# Patient Record
Sex: FEMALE | Race: WHITE | Employment: STUDENT | URBAN - NONMETROPOLITAN AREA
[De-identification: names, ages, dates, MRNs, and addresses within clinical notes are randomized per-mention and may not be internally consistent; named-entity substitution may affect disease eponyms.]

---

## 2018-07-25 ENCOUNTER — OFFICE VISIT (OUTPATIENT)
Dept: FAMILY MEDICINE CLINIC | Age: 18
End: 2018-07-25
Payer: COMMERCIAL

## 2018-07-25 ENCOUNTER — HOSPITAL ENCOUNTER (OUTPATIENT)
Facility: HOSPITAL | Age: 18
Discharge: HOME | End: 2018-07-25
Payer: COMMERCIAL

## 2018-07-25 VITALS
RESPIRATION RATE: 18 BRPM | OXYGEN SATURATION: 99 % | WEIGHT: 187 LBS | DIASTOLIC BLOOD PRESSURE: 64 MMHG | BODY MASS INDEX: 34.41 KG/M2 | SYSTOLIC BLOOD PRESSURE: 120 MMHG | HEART RATE: 82 BPM | HEIGHT: 62 IN

## 2018-07-25 DIAGNOSIS — L73.2 HYDRADENITIS: Primary | ICD-10-CM

## 2018-07-25 DIAGNOSIS — R53.83 FATIGUE, UNSPECIFIED TYPE: ICD-10-CM

## 2018-07-25 DIAGNOSIS — E66.09 CLASS 2 OBESITY DUE TO EXCESS CALORIES WITHOUT SERIOUS COMORBIDITY IN ADULT, UNSPECIFIED BMI: ICD-10-CM

## 2018-07-25 DIAGNOSIS — L73.2 HYDRADENITIS: ICD-10-CM

## 2018-07-25 LAB
A/G RATIO: 2 (ref 0.8–2)
ALBUMIN SERPL-MCNC: 4.8 G/DL (ref 3.4–4.8)
ALP BLD-CCNC: 82 U/L (ref 25–100)
ALT SERPL-CCNC: 15 U/L (ref 4–36)
ANION GAP SERPL CALCULATED.3IONS-SCNC: 14 MMOL/L (ref 3–16)
AST SERPL-CCNC: 14 U/L (ref 8–33)
BASOPHILS ABSOLUTE: 0.1 K/UL (ref 0–0.1)
BASOPHILS RELATIVE PERCENT: 0.5 %
BILIRUB SERPL-MCNC: 1.7 MG/DL (ref 0.3–1.2)
BUN BLDV-MCNC: 11 MG/DL (ref 6–20)
CALCIUM SERPL-MCNC: 9.7 MG/DL (ref 8.5–10.5)
CHLORIDE BLD-SCNC: 103 MMOL/L (ref 98–107)
CO2: 23 MMOL/L (ref 20–30)
CREAT SERPL-MCNC: 0.8 MG/DL (ref 0.4–1.2)
EOSINOPHILS ABSOLUTE: 0.3 K/UL (ref 0–0.4)
EOSINOPHILS RELATIVE PERCENT: 2.8 %
GFR AFRICAN AMERICAN: >59
GFR NON-AFRICAN AMERICAN: >60
GLOBULIN: 2.4 G/DL
GLUCOSE BLD-MCNC: 80 MG/DL (ref 74–106)
HCT VFR BLD CALC: 46.8 % (ref 37–47)
HEMOGLOBIN: 15.5 G/DL (ref 11.5–16.5)
IMMATURE GRANULOCYTES #: 0 K/UL
IMMATURE GRANULOCYTES %: 0.2 % (ref 0–5)
LYMPHOCYTES ABSOLUTE: 2.4 K/UL (ref 1.5–4)
LYMPHOCYTES RELATIVE PERCENT: 24.8 %
MCH RBC QN AUTO: 29.7 PG (ref 27–32)
MCHC RBC AUTO-ENTMCNC: 33.1 G/DL (ref 31–35)
MCV RBC AUTO: 89.7 FL (ref 80–100)
MONOCYTES ABSOLUTE: 1.1 K/UL (ref 0.2–0.8)
MONOCYTES RELATIVE PERCENT: 11.1 %
NEUTROPHILS ABSOLUTE: 5.8 K/UL (ref 2–7.5)
NEUTROPHILS RELATIVE PERCENT: 60.6 %
PDW BLD-RTO: 12.4 % (ref 11–16)
PLATELET # BLD: 383 K/UL (ref 150–400)
PMV BLD AUTO: 10.5 FL (ref 6–10)
POTASSIUM SERPL-SCNC: 4.4 MMOL/L (ref 3.4–5.1)
RBC # BLD: 5.22 M/UL (ref 3.8–5.8)
SODIUM BLD-SCNC: 140 MMOL/L (ref 136–145)
TOTAL PROTEIN: 7.2 G/DL (ref 6.4–8.3)
TSH SERPL DL<=0.05 MIU/L-ACNC: 2.17 UIU/ML (ref 0.35–5.5)
WBC # BLD: 9.6 K/UL (ref 4–11)

## 2018-07-25 PROCEDURE — 85025 COMPLETE CBC W/AUTO DIFF WBC: CPT

## 2018-07-25 PROCEDURE — 80053 COMPREHEN METABOLIC PANEL: CPT

## 2018-07-25 PROCEDURE — 84443 ASSAY THYROID STIM HORMONE: CPT

## 2018-07-25 PROCEDURE — 36415 COLL VENOUS BLD VENIPUNCTURE: CPT

## 2018-07-25 PROCEDURE — 99203 OFFICE O/P NEW LOW 30 MIN: CPT | Performed by: NURSE PRACTITIONER

## 2018-07-25 RX ORDER — PHENTERMINE HYDROCHLORIDE 37.5 MG/1
37.5 TABLET ORAL
Qty: 30 TABLET | Refills: 0 | Status: SHIPPED | OUTPATIENT
Start: 2018-07-25 | End: 2018-08-23 | Stop reason: SDUPTHER

## 2018-07-25 ASSESSMENT — PATIENT HEALTH QUESTIONNAIRE - PHQ9
SUM OF ALL RESPONSES TO PHQ QUESTIONS 1-9: 0
2. FEELING DOWN, DEPRESSED OR HOPELESS: 0
SUM OF ALL RESPONSES TO PHQ9 QUESTIONS 1 & 2: 0
1. LITTLE INTEREST OR PLEASURE IN DOING THINGS: 0

## 2018-07-25 NOTE — PROGRESS NOTES
Negative. Eyes: Negative. Respiratory: Negative. Cardiovascular: Negative. Gastrointestinal: Negative. Genitourinary: Negative. Musculoskeletal: Negative. Skin: Negative. Neurological: Negative. Psychiatric/Behavioral: Negative. All other systems reviewed and are negative. PHYSICAL EXAM:  /64 (Site: Left Arm, Position: Sitting, Cuff Size: Medium Adult)   Pulse 82   Resp 18   Ht 5' 2\" (1.575 m)   Wt 187 lb (84.8 kg)   LMP 06/01/2018 (Approximate)   SpO2 99% Comment: room air  Breastfeeding?  No   BMI 34.20 kg/m²     General Appearance: well developed and well- nourished, in no acute distress  Head: normocephalic and atraumatic  Eyes: pupils equal, round, and reactive to light,  conjunctivae and eye lids without erythema  ENT: external ear and ear canal normal bilaterally, nose without deformity, nasal mucosa and turbinates normal without polyps, oropharynx normal, dentition is normal for age, no lip or gum lesions noted  Neck: supple and non-tender without mass, no thyromegaly or thyroid nodules, no cervical lymphadenopathy  Pulmonary/Chest: clear to auscultation bilaterally- no wheezes, rales or rhonchi, normal air movement, no respiratory distress or retractions  Cardiovascular: normal rate, regular rhythm, normal S1 and S2, no murmurs, rubs, clicks, or gallops, distal pulses intact  Abdomen: soft, non-tender, non-distended, normal bowel sounds,  no rebound or guarding, no masses or hernias noted, no hepatospleenomegaly  Extremities: no cyanosis, clubbing or edema of the lower extremities  Musculoskeletal: No joint swelling or gross deformity   Neuro:  Alert, 5/5 strength globally and symmetrically, 2+ patellar reflexes b/l,  normal speech, no focal findings or movement disorder noted, gait wnl  Psych:  Normal affect without evidence of depression or anxiety, insight and judgement are appropriate, memory appears intact  Skin: warm and dry, no rash or erythema  Lymph:

## 2018-07-31 ASSESSMENT — ENCOUNTER SYMPTOMS
GASTROINTESTINAL NEGATIVE: 1
EYES NEGATIVE: 1
RESPIRATORY NEGATIVE: 1

## 2018-08-23 DIAGNOSIS — E66.09 CLASS 2 OBESITY DUE TO EXCESS CALORIES WITHOUT SERIOUS COMORBIDITY IN ADULT, UNSPECIFIED BMI: ICD-10-CM

## 2018-08-23 RX ORDER — PHENTERMINE HYDROCHLORIDE 37.5 MG/1
37.5 TABLET ORAL
Qty: 30 TABLET | Refills: 0 | Status: SHIPPED | OUTPATIENT
Start: 2018-08-23 | End: 2018-08-27 | Stop reason: SDUPTHER

## 2018-08-23 NOTE — TELEPHONE ENCOUNTER
Refill request received from pharmacy. Medication pending for approval.     Saul Cordoba scanned for review    Patient information below:      Hemoglobin A1C (%)   Date Value   04/21/2014 5.0     AST (U/L)   Date Value   07/25/2018 14     ALT (U/L)   Date Value   07/25/2018 15     BUN (mg/dL)   Date Value   07/25/2018 11      (goal A1C is < 7)   (goal LDL is <100) need 30-50% reduction from baseline     BP Readings from Last 3 Encounters:   07/25/18 120/64   04/28/14 92/62   04/21/14 118/64    (goal /80)      All Future Testing planned in [facility]:      Last Office Visit With PCP:  7/25/2018      Next Visit Date:  No future appointments.          Patient Active Problem List:     Vaginitis and vulvovaginitis     Environmental allergies     Vaginal discharge

## 2018-08-27 DIAGNOSIS — E66.09 CLASS 2 OBESITY DUE TO EXCESS CALORIES WITHOUT SERIOUS COMORBIDITY IN ADULT, UNSPECIFIED BMI: ICD-10-CM

## 2018-08-28 RX ORDER — PHENTERMINE HYDROCHLORIDE 37.5 MG/1
37.5 TABLET ORAL
Qty: 30 TABLET | Refills: 0 | Status: SHIPPED | OUTPATIENT
Start: 2018-08-28 | End: 2018-09-27

## 2025-01-23 ENCOUNTER — OFFICE VISIT (OUTPATIENT)
Age: 25
End: 2025-01-23
Payer: COMMERCIAL

## 2025-01-23 VITALS
HEIGHT: 60 IN | SYSTOLIC BLOOD PRESSURE: 120 MMHG | WEIGHT: 187 LBS | OXYGEN SATURATION: 99 % | HEART RATE: 102 BPM | DIASTOLIC BLOOD PRESSURE: 74 MMHG | BODY MASS INDEX: 36.71 KG/M2

## 2025-01-23 DIAGNOSIS — E28.2 PCOS (POLYCYSTIC OVARIAN SYNDROME): ICD-10-CM

## 2025-01-23 PROCEDURE — 84445 ASSAY OF TSI GLOBULIN: CPT | Performed by: PHYSICIAN ASSISTANT

## 2025-01-23 RX ORDER — BUSPIRONE HYDROCHLORIDE 10 MG/1
10 TABLET ORAL 2 TIMES DAILY
Qty: 60 TABLET | Refills: 2 | Status: SHIPPED | OUTPATIENT
Start: 2025-01-23

## 2025-01-23 RX ORDER — SERTRALINE HYDROCHLORIDE 100 MG/1
1 TABLET, FILM COATED ORAL DAILY
COMMUNITY

## 2025-01-23 RX ORDER — SPIRONOLACTONE 100 MG/1
1 TABLET, FILM COATED ORAL DAILY
COMMUNITY

## 2025-01-23 RX ORDER — METOPROLOL SUCCINATE 25 MG/1
25 TABLET, EXTENDED RELEASE ORAL
COMMUNITY

## 2025-01-23 NOTE — PROGRESS NOTES
Office Note      Date: 2025  Patient Name: Kary Figueroa  MRN: [id number]  : 2000    Chief Complaint   Patient presents with    Abnormal Lab     Low TSH       History of Present Illness:   Kary Figueroa is a 24 y.o. female who presents for Abnormal Lab (Low TSH)  Symptoms are: heat intolerance, increased bowel movements, palpitations, increased in agitation/anxiety. Symptoms began in: years ago. They are worsening. Lab work showed overactive function. Delivered baby 2024. No history of thyroid problems in past, but has had a few miscarriages over the last four years, has one other child, as well. Has metoprolol at home but has only taken one dose in the last week. No unintentional weight loss.    Also, just recently diagnosed with pcos and has facial hair. Started on metformin and spironolactone already recently.  Asking if she can take adipex for weight loss.    Subjective      Patient was born where: [place].  Facial radiation exposure: NO.  High iodine intake: No  Family hx of thyroid disease: No.    Review of Systems:   Review of Systems   Constitutional:  Positive for activity change, appetite change, diaphoresis and fatigue.   HENT:  Positive for rhinorrhea, tinnitus and trouble swallowing.    Eyes:  Positive for photophobia.   Respiratory:  Positive for choking, chest tightness and shortness of breath.    Cardiovascular:  Positive for chest pain and palpitations.   Gastrointestinal:  Positive for anal bleeding, blood in stool, constipation and diarrhea.   Endocrine: Positive for heat intolerance, polydipsia, polyphagia and polyuria (normal a1c last month).   Genitourinary:  Positive for urgency.   Musculoskeletal:  Positive for arthralgias, back pain and myalgias.   Allergic/Immunologic: Positive for environmental allergies.   Neurological:  Positive for dizziness, weakness and light-headedness.   Hematological:  Bruises/bleeds easily.   Psychiatric/Behavioral:  Positive for decreased  "concentration, dysphoric mood and sleep disturbance. The patient is nervous/anxious.        The following portions of the patient's history were reviewed and updated as appropriate: allergies, current medications, past family history, past medical history, past social history, past surgical history, and problem list.    Objective     Visit Vitals  /74 (BP Location: Left arm, Patient Position: Sitting, Cuff Size: Adult)   Pulse 102   Ht 152.4 cm (60\")   Wt 84.8 kg (187 lb)   SpO2 99%   BMI 36.52 kg/m²       Physical Exam:  Physical Exam  Vitals reviewed.   Constitutional:       General: She is not in acute distress.     Appearance: Normal appearance. She is obese.   Neck:      Thyroid: No thyroid mass, thyromegaly or thyroid tenderness.   Cardiovascular:      Rate and Rhythm: Regular rhythm. Tachycardia present.      Heart sounds: Normal heart sounds.   Pulmonary:      Effort: Pulmonary effort is normal.      Breath sounds: Normal breath sounds.   Musculoskeletal:      Cervical back: Neck supple.   Lymphadenopathy:      Cervical: No cervical adenopathy.   Skin:     General: Skin is warm and dry.   Neurological:      General: No focal deficit present.      Mental Status: She is alert and oriented to person, place, and time. Mental status is at baseline.   Psychiatric:         Mood and Affect: Mood normal.         Behavior: Behavior normal.         Thought Content: Thought content normal.         Judgment: Judgment normal.       NO exopthhalmous    Labs from 1/2/2025:    TSH  Less than 0.005    Free T4  Elevated at 3.03    Free T3  Elevated 13.3     TPO  <9, normal    TG AB  4.92, elevated    Thyroid ultrasound on 1/16/2025:    Thyroid is normal in size.  Parenchyma is mildly heterogeneous diffusely.  No nodules identified.    Assessment / Plan      Assessment & Plan:  Diagnoses and all orders for this visit:    1. Post partum thyroiditis (Primary)  Assessment & Plan:  Has been almost a year since delivery, but " patient may still have post partum thyroiditis  Check tsi to rule out graves disease, but thyroid ultrasound this month negative for nodules (so no toxic nodules as cause)  Take metoprolol daily for tachycardia and heat intolerance  NO adipex, as this medication will intensify side effects with current hyperthyroid state  Buspar prn agitation/anxiety, continue sertraline prescribed by pcp for both anxiety and depressive symptoms  Recheck thyroid levels in six weeks at local lab  Fu 3 months    Orders:  -     busPIRone (BUSPAR) 10 MG tablet; Take 1 tablet by mouth 2 (Two) Times a Day.  Dispense: 60 tablet; Refill: 2  -     Thyroid Stimulating Immunoglobulin  -     TSH; Future  -     T4, Free; Future  -     T3, Free; Future    2. PCOS (polycystic ovarian syndrome)  Assessment & Plan:  Reassured patient meds will help with facial hair, but won't make it completely go away most of the time  Continue metformin/spironolactone         Current Outpatient Medications   Medication Instructions    busPIRone (BUSPAR) 10 mg, Oral, 2 Times Daily    metFORMIN (GLUCOPHAGE) 500 mg, Daily With Breakfast    metoprolol succinate XL (TOPROL-XL) 25 mg    sertraline (ZOLOFT) 100 MG tablet 1 tablet, Daily    spironolactone (ALDACTONE) 100 MG tablet 1 tablet, Daily      Return in about 3 months (around 4/23/2025).    Electronically signed by: Carl Rojas PA-C  01/23/2025

## 2025-01-23 NOTE — ASSESSMENT & PLAN NOTE
Reassured patient meds will help with facial hair, but won't make it completely go away most of the time  Continue metformin/spironolactone

## 2025-01-23 NOTE — ASSESSMENT & PLAN NOTE
Has been almost a year since delivery, but patient may still have post partum thyroiditis  Check tsi to rule out graves disease, but thyroid ultrasound this month negative for nodules (so no toxic nodules as cause)  Take metoprolol daily for tachycardia and heat intolerance  NO adipex, as this medication will intensify side effects with current hyperthyroid state  Buspar prn agitation/anxiety, continue sertraline prescribed by pcp for both anxiety and depressive symptoms  Recheck thyroid levels in six weeks at local lab  Fu 3 months

## 2025-01-27 LAB — TSI SER-ACNC: 3.19 IU/L (ref 0–0.55)

## 2025-01-28 ENCOUNTER — TELEPHONE (OUTPATIENT)
Age: 25
End: 2025-01-28
Payer: COMMERCIAL

## 2025-01-28 DIAGNOSIS — E05.00 GRAVES' DISEASE: Primary | ICD-10-CM

## 2025-01-28 RX ORDER — METHIMAZOLE 10 MG/1
10 TABLET ORAL DAILY
Qty: 90 TABLET | Refills: 3 | Status: SHIPPED | OUTPATIENT
Start: 2025-01-28 | End: 2026-01-28

## 2025-01-28 NOTE — TELEPHONE ENCOUNTER
Spoke to patient and told her recommendations.  She also wanted to know if it was ok for her to exercise when her heart rate is elevated?  She is currently on metoprolol.

## 2025-01-28 NOTE — TELEPHONE ENCOUNTER
Please call patient and tell her that she has Graves disease instead of thyroiditis like we discussed at her visit. She will need to start a medication called tapazole to prevent her thyroid from making too much thyroid hormone. Also remind her to get her labs again in March, and I can adjust medication if needed at that time.

## 2025-02-18 ENCOUNTER — TELEPHONE (OUTPATIENT)
Age: 25
End: 2025-02-18

## 2025-02-18 NOTE — TELEPHONE ENCOUNTER
Caller: Kary Figueroa    Relationship to patient: Self    Best call back number: [phone]     Patient is needing: PT FEELS THAT CURRENT TREATMENT PLAN FOR THYROIDITIS IS NOT EFFECTIVE, REQUESTS CALLBACK TO DISCUSS THYROIDECTOMY. PLEASE CALL TO ADVISE.

## 2025-02-19 NOTE — TELEPHONE ENCOUNTER
Spoke to patient and told her [facility] recommendations.  Patient verbalized understanding.